# Patient Record
Sex: FEMALE | Race: OTHER
[De-identification: names, ages, dates, MRNs, and addresses within clinical notes are randomized per-mention and may not be internally consistent; named-entity substitution may affect disease eponyms.]

---

## 2020-11-19 ENCOUNTER — HOSPITAL ENCOUNTER (OUTPATIENT)
Dept: HOSPITAL 62 - RDC | Age: 35
End: 2020-11-19
Attending: NURSE PRACTITIONER
Payer: COMMERCIAL

## 2020-11-19 VITALS — DIASTOLIC BLOOD PRESSURE: 80 MMHG | SYSTOLIC BLOOD PRESSURE: 121 MMHG

## 2020-11-19 DIAGNOSIS — J02.9: ICD-10-CM

## 2020-11-19 DIAGNOSIS — J06.9: ICD-10-CM

## 2020-11-19 DIAGNOSIS — M79.10: ICD-10-CM

## 2020-11-19 DIAGNOSIS — R51.9: ICD-10-CM

## 2020-11-19 DIAGNOSIS — Z88.6: ICD-10-CM

## 2020-11-19 DIAGNOSIS — Z20.828: Primary | ICD-10-CM

## 2020-11-19 LAB
A TYPE INFLUENZA AG: NEGATIVE
B INFLUENZA AG: NEGATIVE

## 2020-11-19 PROCEDURE — 87880 STREP A ASSAY W/OPTIC: CPT

## 2020-11-19 PROCEDURE — 87635 SARS-COV-2 COVID-19 AMP PRB: CPT

## 2020-11-19 PROCEDURE — 87070 CULTURE OTHR SPECIMN AEROBIC: CPT

## 2020-11-19 PROCEDURE — C9803 HOPD COVID-19 SPEC COLLECT: HCPCS

## 2020-11-19 PROCEDURE — 87804 INFLUENZA ASSAY W/OPTIC: CPT

## 2020-11-19 PROCEDURE — 99211 OFF/OP EST MAY X REQ PHY/QHP: CPT

## 2020-11-19 NOTE — ER RDC ASSESSMENT REPORT
Intake





- In the Last 14 days


Have you traveled outside [place]?: No


Have you been in close contact with someone CONFIRMED: Yes


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





- Symptoms


Subjective Fever(Felt feverish): No


Chills: No


Muscule Aches: Yes


Runny Nose: No


Sore Throat: Yes


Cough (New or worsening chronic cough): No


Shortness of breath: No


Nausea or Vomiting: No


Headache: Yes


Abdominal Pain: No


Diarrhea(3 or more loose stools in last 24 hours): No





- Do you have any of the following


Chronic lung disease: Asthma or emphysema or COPD: Yes


Cystic Fibrosis: No


Diabetes: No


High Blood Pressure: No


Cardiovascular Disease: No


Chronic Kidney Disease: No


Chronic Liver Disease: No


Chronic blood disorder like Sickle Cell Disease: No


Weak immune system due to disease or medication: No


Neurologic condition that limits movement: No


Developmental delay - Moderate to Severe: No


Recent (within past 2 weeks) or current Pregnancy: No


Morbid Obesity (>100 pounds over ideal weight): No





- Objective


Temperature: 99.2 F


Pulse Rate: 73


Respiratory Rate: 18


Blood Pressure: 121/80


O2 Sat by Pulse Oximetry: 99


Objective: 


Given above, testing performed: 








flu, strep, covid


Disposition: Home; Selfcare





General





- General


Stated Complaint: sore throat


Time Seen by Provider: 11/19/20 09:20


Mode of Arrival: Ambulatory


Information source: Patient





- HPI


Notes: 





35-year-old female presents to [facility] clinic for COVID-19 testing.  Patient is 

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  She has had contact with Covid 

positive patients.  Onset of symptoms was yesterday.  Currently reporting muscle

aches, sore throat, and headache.  Denies any fever or chills, runny nose, 

cough, shortness of breath, nausea or vomiting, abdominal pain or diarrhea.





- Related Data


Allergies/Adverse Reactions: 


                                        





codeine Allergy (Verified 11/19/20 09:43)


   











Past Medical History





- General


Information source: Patient





- Social History


Smoking Status: Never Smoker





- Past Medical History


Cardiac Medical History: Reports: None


Pulmonary Medical History: Reports: Hx Asthma


EENT Medical History: Reports: None


Neurological Medical History: Reports: None


Endocrine Medical History: Reports: None


Renal/ Medical History: Reports: None


Malignancy Medical History: Reports: None


GI Medical History: Reports: None


Musculoskeletal Medical History: Reports None


Skin Medical History: Reports None


Psychiatric Medical History: Reports: None


Traumatic Medical History: Reports: None


Infectious Medical History: Reports: None


Past Surgical History: Reports: None





Physical Exam





- General


General appearance: Appears well, Alert


In distress: None


Notes: 





PHYSICAL EXAMINATION: 


GENERAL: Well-appearing and in no acute distress. 


HEAD: Atraumatic, normocephalic. 


EYES: sclera anicteric, conjunctiva are normal. 


ENT: nares patent. Moist mucous membranes. 


NECK: Normal range of motion, supple without lymphadenopathy. 


LUNGS: No increased work of breathing. Lung sounds CTAB and equal. No wheezes 

rales or rhonchi. 


HEART: Regular rate and rhythm without murmurs.


ABDOMEN: Soft, nontender, normal bowel sounds, no guarding. 


EXTREMITIES: Normal range of motion, no pitting edema. No cyanosis. 


NEUROLOGICAL: A&O x 3. Normal speech. 


PSYCH: Normal mood, normal affect. 


SKIN: Warm, Dry, normal turgor, no rashes or lesions noted








Patient Education/Counseling


Counseling/Education: 





Patient presents with symptoms associated with possible Covid 19 infection.  

Patient does not have emergency worrying symptoms such as difficulty breathing, 

shortness of breath, chest pain, pressure, confusion or cyanosis.  Patient 

appears suitable for discharge as vital signs are stable and patient is nontoxic

in appearance.  Good return precautions have been discussed with patient, 

patient verbalized understanding and is agreeable with discharge plan of care at

this time.


Guidance for worsening S/SX: 


As a person under investigation for Covid 19, the [place] department of 

Health and Human Services, division of public health advises you to adhere to 

the following guidance until your test results are reported to you.  If your 

test result is positive, you will receive additional information from your 

provider and your local health department at that time.


 


Remain at home until you are cleared by the health provider or public health 

authorities.


 


Keep a log of visitors to your home, notify any visitors to your home of your 

isolation status.


 


If you plan to move to a new address or leave the county, notify the local 

health department in your County.


 


Call your doctor or seek care if you have an urgent medical need.  Before 

seeking medical care, call ahead to get instructions from the provider before 

arriving at the medical office clinic or hospital.  Notify them that you are 

being tested for the virus that causes Covid 19 so that arrangements can be 

made, as necessary, to prevent transmission to others in the healthcare setting.

 Next, notify the local health department in your county.


 


If a medical emergency arises and you need to call 911, inform the first 

responders that you are being tested for the virus that causes Covid 19.  Next, 

notify the local health department in your county.





RDC Discharge





- Discharge


Clinical Impression: 


 Encounter for screening laboratory testing for COVID-19 virus





Upper respiratory infection


Qualifiers:


 URI type: unspecified URI Qualified Code(s): J06.9 - Acute upper respiratory 

infection, unspecified





Condition: Good


Disposition: Home; Selfcare